# Patient Record
Sex: MALE | Race: WHITE
[De-identification: names, ages, dates, MRNs, and addresses within clinical notes are randomized per-mention and may not be internally consistent; named-entity substitution may affect disease eponyms.]

---

## 2019-07-05 ENCOUNTER — HOSPITAL ENCOUNTER (INPATIENT)
Dept: HOSPITAL 4 - SED | Age: 59
LOS: 1 days | Discharge: HOME | DRG: 563 | End: 2019-07-06
Attending: ORTHOPAEDIC SURGERY | Admitting: ORTHOPAEDIC SURGERY
Payer: COMMERCIAL

## 2019-07-05 VITALS — HEIGHT: 69 IN | BODY MASS INDEX: 41.47 KG/M2 | WEIGHT: 280 LBS

## 2019-07-05 VITALS — SYSTOLIC BLOOD PRESSURE: 169 MMHG

## 2019-07-05 DIAGNOSIS — S43.005A: Primary | ICD-10-CM

## 2019-07-05 DIAGNOSIS — I10: ICD-10-CM
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
## 2019-07-05 DIAGNOSIS — E87.6: ICD-10-CM

## 2019-07-05 DIAGNOSIS — R74.0: ICD-10-CM
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
## 2019-07-05 NOTE — NUR
Pt medicated with IV fentanyl and ativan per MD order. Tolerated well Will 
cont. to monitor on cardiac and O2 monitor. ER MD made aware.

## 2019-07-05 NOTE — NUR
Pt AAOx4 ambulated into ED c/o 5/10 pain to L shoulder r/t L shoulder 
dislocation s/p falling down stairs at 0930 this morning. Pt was seen at [facility] and given 4mg Morphine IV x 3. IV to R AC still in place from facility. 
Referred to ED for reduction of shoulder. No other injuries/complaints per 
pt/noted. Will continue to monitor.

## 2019-07-06 VITALS — SYSTOLIC BLOOD PRESSURE: 148 MMHG

## 2019-07-06 VITALS — SYSTOLIC BLOOD PRESSURE: 152 MMHG

## 2019-07-06 VITALS — SYSTOLIC BLOOD PRESSURE: 146 MMHG

## 2019-07-06 LAB
ALBUMIN SERPL BCP-MCNC: 3.6 G/DL (ref 3.4–4.8)
ALT SERPL W P-5'-P-CCNC: 44 U/L (ref 12–78)
ANION GAP SERPL CALCULATED.3IONS-SCNC: 7 MMOL/L (ref 5–15)
APPEARANCE UR: CLEAR
AST SERPL W P-5'-P-CCNC: 48 U/L (ref 10–37)
BASOPHILS # BLD AUTO: 0.1 K/UL (ref 0–0.2)
BASOPHILS NFR BLD AUTO: 0.7 % (ref 0–2)
BILIRUB SERPL-MCNC: 1.1 MG/DL (ref 0–1)
BILIRUB UR QL STRIP: NEGATIVE
BUN SERPL-MCNC: 16 MG/DL (ref 8–21)
CALCIUM SERPL-MCNC: 8.1 MG/DL (ref 8.4–11)
CHLORIDE SERPL-SCNC: 102 MMOL/L (ref 98–107)
COLOR UR: YELLOW
CREAT SERPL-MCNC: 0.95 MG/DL (ref 0.55–1.3)
EOSINOPHIL # BLD AUTO: 0 K/UL (ref 0–0.4)
EOSINOPHIL NFR BLD AUTO: 0.3 % (ref 0–4)
ERYTHROCYTE [DISTWIDTH] IN BLOOD BY AUTOMATED COUNT: 12.9 % (ref 9–15)
GFR SERPL CREATININE-BSD FRML MDRD: 104 ML/MIN (ref 90–?)
GLUCOSE SERPL-MCNC: 130 MG/DL (ref 70–99)
GLUCOSE UR STRIP-MCNC: NEGATIVE MG/DL
HCT VFR BLD AUTO: 47.8 % (ref 36–54)
HGB BLD-MCNC: 16.4 G/DL (ref 14–18)
HGB UR QL STRIP: NEGATIVE
KETONES UR STRIP-MCNC: NEGATIVE MG/DL
LEUKOCYTE ESTERASE UR QL STRIP: NEGATIVE
LYMPHOCYTES # BLD AUTO: 1.2 K/UL (ref 1–5.5)
LYMPHOCYTES NFR BLD AUTO: 13.7 % (ref 20.5–51.5)
MCH RBC QN AUTO: 32 PG (ref 27–31)
MCHC RBC AUTO-ENTMCNC: 34 % (ref 32–36)
MCV RBC AUTO: 92 FL (ref 79–98)
MONOCYTES # BLD MANUAL: 0.6 K/UL (ref 0–1)
MONOCYTES # BLD MANUAL: 6.7 % (ref 1.7–9.3)
NEUTROPHILS # BLD AUTO: 7.2 K/UL (ref 1.8–7.7)
NEUTROPHILS NFR BLD AUTO: 78.6 % (ref 40–70)
NITRITE UR QL STRIP: NEGATIVE
PH UR STRIP: 6 [PH] (ref 5–8)
PLATELET # BLD AUTO: 130 K/UL (ref 130–430)
POTASSIUM SERPL-SCNC: 3.1 MMOL/L (ref 3.5–5.1)
PROT UR QL STRIP: NEGATIVE
RBC # BLD AUTO: 5.19 MIL/UL (ref 4.2–6.2)
SODIUM SERPLBLD-SCNC: 135 MMOL/L (ref 136–145)
SP GR UR STRIP: 1.02 (ref 1–1.03)
UROBILINOGEN UR STRIP-MCNC: 0.2 MG/DL (ref 0.2–1)
WBC # BLD AUTO: 9.1 K/UL (ref 4.8–10.8)

## 2019-07-06 PROCEDURE — 0RSKXZZ REPOSITION LEFT SHOULDER JOINT, EXTERNAL APPROACH: ICD-10-PCS | Performed by: ORTHOPAEDIC SURGERY

## 2019-07-06 NOTE — NUR
3mg Versed IVP administered. Dr. Gregg attempted shoulder reduction. Pt c/o pain 
during procedure. Verbal order of Etomidate 20mg IVP. No etomidate available in 
pyxis. RSI kit overrode to obtain Etomidate.

## 2019-07-06 NOTE — NUR
Transfer to [facility] via ACLS protocol. Licensed nurse present. IV present no 
signs or symptoms of infiltration.

## 2019-07-06 NOTE — NUR
Nutrition Update



Rashawn Scale 17 noted.

Pt admitted for L dislocated shoulder.

Diet: regular

BMI: 41.3 kg/m2



RD to follow per nutrition care standards.

## 2019-07-06 NOTE — NUR
# 20 gauge angiocath placed to R hand.  Use of asceptic technique.  Opsite 
placed over site.  Blood return noted. Flushed with 10 cc of normal saline.  No 
evidence of infiltration noted.  Patient tolerated well.

## 2019-07-06 NOTE — NUR
Patient will be admitted to care of Dr. Diane.  Admitted to MS unit.  Will go to 
room 124.  Belongings list completed.  Summary report printed. Report will be 
given at bedside.

## 2019-07-06 NOTE — NUR
RN Note

Patient returned back from OR awake and in stable condition. Left shoulder is in sling. 
Patient is able to move the fingers in the affected extremity

## 2019-07-06 NOTE — NUR
ADMISSION NOTE

Received patient from ER via kathy, received report from CHYNA SENIOR. Patient admitted with 
diagnosis of LEFT SHOULDER DISLOCATON. Patient oriented to hospital routine, call light, 
toileting and safety-patient verbalized understanding.

## 2019-07-06 NOTE — NUR
Patient awake alert 

 verbally indicative , LEFT ARM SHOULDER SLING in place mild edema motor movement is noted 
to fingers & hand with mild pain this hour comfort measures implemented .

## 2019-07-06 NOTE — NUR
Transition of Care 

Patient given medication reconciliation form and D/C instructions. Exit Care provided. 
Patient verbalized understanding. MD discussed with patient the results and treatment 
provided. Ambulatory with steady gait for discharge to home. Patient in stable condition, ID 
band removed. IV catheter removed, intact and dressing applied, no active bleeding. Rx of 
Norco  given. Patient educated on pain management. All belongings sent with patient.

## 2019-07-06 NOTE — NUR
Rounds

Patient is awake. He is able to move all the fingers on the left hand. Cap refill is less 
than 3 sec. left radial pulse is palpable.

## 2019-07-06 NOTE — NUR
Etomidate 20mg IVP administered by Dr. Gregg. Pt tolerated well. Pt asleep. VSS. 
Dr. Gregg attempted shoulder reduction.

## 2019-07-06 NOTE — NUR
XR showed unsuccessful reduction. Pt awake and alert. Dr. Gregg verbal order of 
20mg Etomidate IVP.

## 2019-07-06 NOTE — NUR
Radiology at bedside for post reduction XR. XR shows unsuccessful shoulder 
reduction. Orders to be received.

## 2019-07-06 NOTE — NUR
Opening Note

Report received from [facility] shift nurse. Patient is currently in OR.

## 2019-07-06 NOTE — NUR
30mg Ketamine IVP administered by Dr. Gregg. Pt tolerated well. Pt closing eyes 
and moderately sedated. Dr. Gregg attempted reduction of shoulder. XR ordered.

## 2019-07-06 NOTE — NUR
Versed 3 mg given IVP per MD order. Tolerated well. ER MD at bedside performing 
shoulder reduction.

## 2019-07-06 NOTE — NUR
Per Chay, ER , the pt's main health plan does not have an on 
call CM to contact for admission authorization.

## 2019-07-06 NOTE — NUR
10mg Etomidate IVP administered. Pt tolerated well. Pt able to countdown from 
30. Pt alert and oriented. Dr. Gregg administerd second 10mg Etomidate IVP. Pt 
tolerated well. Resistance noted to IV site.  Skin proximal to IV site hard to 
touch. Signs of infiltration noted. Pt denies pain at IV site.

## 2019-07-11 ENCOUNTER — HOSPITAL ENCOUNTER (EMERGENCY)
Dept: HOSPITAL 4 - SED | Age: 59
Discharge: HOME | End: 2019-07-11
Payer: COMMERCIAL

## 2019-07-11 VITALS — HEIGHT: 71 IN | WEIGHT: 245 LBS | BODY MASS INDEX: 34.3 KG/M2

## 2019-07-11 VITALS — SYSTOLIC BLOOD PRESSURE: 130 MMHG

## 2019-07-11 VITALS — SYSTOLIC BLOOD PRESSURE: 217 MMHG
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
## 2019-07-11 DIAGNOSIS — I10: ICD-10-CM
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
## 2019-07-11 DIAGNOSIS — S43.005A: Primary | ICD-10-CM
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
## 2019-07-11 DIAGNOSIS — Z79.899: ICD-10-CM

## 2019-07-11 PROCEDURE — 96372 THER/PROPH/DIAG INJ SC/IM: CPT

## 2019-07-11 PROCEDURE — 99285 EMERGENCY DEPT VISIT HI MDM: CPT

## 2019-07-11 PROCEDURE — 73020 X-RAY EXAM OF SHOULDER: CPT

## 2019-07-11 PROCEDURE — 23650 CLTX SHO DSLC W/MNPJ WO ANES: CPT

## 2019-07-11 PROCEDURE — 73030 X-RAY EXAM OF SHOULDER: CPT

## 2019-07-11 PROCEDURE — 99152 MOD SED SAME PHYS/QHP 5/>YRS: CPT

## 2019-07-11 NOTE — NUR
CONSIOUS SEDATION AT BEDSIDE. PT DESAT TO 70'S AND DID NOT READ SPO2. STARTED 
BAG THE PT VIA MASK/AMBUBAG WITH 100% O2. PT SPO2 INCREASED TO SP02 90%. 
CHANGED TO NC. SPO2 99%, HR 96.

## 2019-07-11 NOTE — NUR
PT started procedure at 1538 and was given Ketamine and Midazolam for sadation. 
PT shoulder placed back in joint but during recovery PT O2 stats fell to 62%. 
RT, MD, and RN at bedside. Artificial airway placed and flumazenil given to 
patient x2 0.2 mg. PT recovered and has been at 96% or greater post recovery. 
PT awoke at 1555.

## 2019-07-11 NOTE — NUR
Patient resting quietly with wife at bedside.  No acute distress noted.  Vital 
signs within normal range from baseline. PT given 0.3 catapres for high BP.

## 2019-07-11 NOTE — NUR
Patient given written and verbal discharge instructions and verbalizes 
understanding.  ER MD discussed with patient the results and treatment 
provided. Patient in stable condition. ID arm band removed. IV catheter removed 
intact and dressing applied, no active bleeding.

Patient educated on pain management and to follow up with PMD. Pain Scale 0/10.

Opportunity for questions provided and answered. Medication side effect fact 
sheet provided.

## 2022-06-07 ENCOUNTER — HOSPITAL ENCOUNTER (OUTPATIENT)
Dept: HOSPITAL 4 - SED | Age: 62
Discharge: HOME | End: 2022-06-07
Attending: STUDENT IN AN ORGANIZED HEALTH CARE EDUCATION/TRAINING PROGRAM
Payer: COMMERCIAL

## 2022-06-07 VITALS — WEIGHT: 270 LBS | HEIGHT: 70 IN | BODY MASS INDEX: 38.65 KG/M2

## 2022-06-07 VITALS — SYSTOLIC BLOOD PRESSURE: 166 MMHG

## 2022-06-07 VITALS — SYSTOLIC BLOOD PRESSURE: 118 MMHG
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
## 2022-06-07 DIAGNOSIS — S43.005A: Primary | ICD-10-CM
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
## 2022-06-07 DIAGNOSIS — I10: ICD-10-CM

## 2022-06-07 PROCEDURE — 73030 X-RAY EXAM OF SHOULDER: CPT

## 2022-06-07 PROCEDURE — 23650 CLTX SHO DSLC W/MNPJ WO ANES: CPT

## 2022-06-07 PROCEDURE — L3650 SO 8 ABD RESTRAINT PRE OTS: HCPCS
